# Patient Record
Sex: MALE | Race: WHITE | URBAN - METROPOLITAN AREA
[De-identification: names, ages, dates, MRNs, and addresses within clinical notes are randomized per-mention and may not be internally consistent; named-entity substitution may affect disease eponyms.]

---

## 2017-04-06 ENCOUNTER — TRANSFERRED RECORDS (OUTPATIENT)
Dept: HEALTH INFORMATION MANAGEMENT | Facility: CLINIC | Age: 37
End: 2017-04-06

## 2017-04-07 ENCOUNTER — TELEPHONE (OUTPATIENT)
Dept: OPHTHALMOLOGY | Facility: CLINIC | Age: 37
End: 2017-04-07

## 2017-04-07 ENCOUNTER — OFFICE VISIT (OUTPATIENT)
Dept: OPHTHALMOLOGY | Facility: CLINIC | Age: 37
End: 2017-04-07
Attending: OPHTHALMOLOGY
Payer: COMMERCIAL

## 2017-04-07 DIAGNOSIS — H53.10 SUBJECTIVE VISUAL DISTURBANCE, RIGHT EYE: Primary | ICD-10-CM

## 2017-04-07 DIAGNOSIS — H53.10 SUBJECTIVE VISUAL DISTURBANCE, RIGHT EYE: ICD-10-CM

## 2017-04-07 DIAGNOSIS — H47.011 NONARTERITIC ISCHEMIC OPTIC NEUROPATHY OF RIGHT EYE: Primary | ICD-10-CM

## 2017-04-07 PROCEDURE — 92250 FUNDUS PHOTOGRAPHY W/I&R: CPT | Mod: ZF | Performed by: OPHTHALMOLOGY

## 2017-04-07 PROCEDURE — 99212 OFFICE O/P EST SF 10 MIN: CPT | Mod: ZF

## 2017-04-07 PROCEDURE — 92083 EXTENDED VISUAL FIELD XM: CPT | Mod: ZF | Performed by: OPHTHALMOLOGY

## 2017-04-07 PROCEDURE — 92283 EXTND COLOR VISION XM: CPT | Mod: ZF | Performed by: OPHTHALMOLOGY

## 2017-04-07 PROCEDURE — 92133 CPTRZD OPH DX IMG PST SGM ON: CPT | Mod: ZF | Performed by: OPHTHALMOLOGY

## 2017-04-07 ASSESSMENT — SLIT LAMP EXAM - LIDS
COMMENTS: NORMAL
COMMENTS: NORMAL

## 2017-04-07 ASSESSMENT — EXTERNAL EXAM - LEFT EYE: OS_EXAM: NORMAL

## 2017-04-07 ASSESSMENT — TONOMETRY
IOP_METHOD: TONOPEN
OS_IOP_MMHG: 14
OD_IOP_MMHG: 13

## 2017-04-07 ASSESSMENT — CUP TO DISC RATIO
OD_RATIO: 0.1
OS_RATIO: 0.2

## 2017-04-07 ASSESSMENT — CONF VISUAL FIELD
OS_NORMAL: 1
OD_INFERIOR_NASAL_RESTRICTION: 3

## 2017-04-07 ASSESSMENT — REFRACTION_WEARINGRX
OS_AXIS: 155
OD_SPHERE: -2.00
OD_AXIS: 010
OS_CYLINDER: +0.50
OS_SPHERE: -2.25
OD_CYLINDER: +1.00

## 2017-04-07 ASSESSMENT — VISUAL ACUITY
OS_CC: 20/15
METHOD: SNELLEN - LINEAR
CORRECTION_TYPE: GLASSES
OS_CC: J1+
OD_CC+: -2
OD_CC: J1+
OD_CC: 20/15

## 2017-04-07 ASSESSMENT — EXTERNAL EXAM - RIGHT EYE: OD_EXAM: NORMAL

## 2017-04-07 NOTE — PROGRESS NOTES
Assessment & Plan     Yakov Harris is a 36 year old male with the following diagnoses:   1. 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 Subjective visual disturbance, right eye       35YO M with no past med hx referred VRS for possible NAION / optic neuritis of the right eye. Distorted vision lower periphery right eye noticed initially 2 weeks ago. Not a blind spot but there is a distortion. Slowly progressing. No eye pain. No other visual symptoms.     Wife states that he does snore and has had apneic episodes (although these don't occur frequently).  He does not take any meds. He denies taking cilais or vigara.    He has superior segmental swelling of the right eye. He is small C:D ratio of the left eye. This is consistent with NAION. We discussed possible risk factors, including his sleep apnea. He will make an appointment with his PCP to check his blood pressure, cholestrol, sugars, as well as to discuss sleep study.     RTC in 1 month or sooner if worsening symptoms.     Marshall Eden MD  Ophthalmology resident, PGY-3                 Not seen by staff during this visit, available should need have arisen.  Plan appropriate as above.    Per Fong MD  , Comprehensive Ophthalmology  Department of Ophthalmology and Visual Neurosciences  [facility]

## 2017-04-07 NOTE — TELEPHONE ENCOUNTER
Dr. Abdirashid Peck referring to neuro-ophthalmology for NAION vs optic neuropathy  Records to be faxed  No MRI   Spoke to pt   Pt reports new right eye vision loss about march 27th.  Blurred scattered vision in lower peripheral vision  Has worsened in past 12 days.  No pain with eye movement    Reviewed with Dr. dEen and Dr. Eden will see today for sonny Tatum RN 11:33 AM 04/07/17

## 2017-04-07 NOTE — NURSING NOTE
Chief Complaints and History of Present Illnesses   Patient presents with     New Patient     Subjective visual disturbance, right eye (Primary      HPI    Affected eye(s):  Right   Symptoms:     Blurred vision   Distorted vision   No double vision   No floaters   Flashes      Frequency:  Constant          Comments:  Pt. Stated no double vision, he has a head ache today but due to the dilation drops OU.  Nils Benitez  1:24 PM April 7, 2017

## 2017-04-07 NOTE — MR AVS SNAPSHOT
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      Who to contact     Please call your clinic at [phone] to:    Ask questions about your health    Make or cancel appointments    Discuss your medicines    Learn about your test results    Speak to your doctor   If you have compliments or concerns about an experience at your clinic, or if you wish to file a complaint, please contact [facility] Patient Relations at [phone] or email us at [email or web address]         Additional Information About Your Visit        MyChart Information     AppLabst gives you secure access to your electronic health record. If you see a primary care provider, you can also send messages to your care team and make appointments. If you have questions, please call your primary care clinic.  If you do not have a primary care provider, please call [phone] and they will assist you.      Sinimanes is an electronic gateway that provides easy, online access to your medical records. With Sinimanes, you can request a clinic appointment, read your test results, renew a prescription or communicate with your care team.     To access your existing account, please contact your [facility] or call [phone] for assistance.        Care EveryWhere ID     This is your Care EveryWhere ID. This could be used by other organizations to access your [place] medical records  [id number]          Blood Pressure from Last 3 Encounters:   No data found for BP    Weight from Last 3 Encounters:   No data found for Wt              We Performed the Following     Color Vision - Screening OU (both eyes)     Fundus Photos OU (both eyes)     Glaucoma Top OU     OCT Optic Nerve RNFL Spectralis OU (both eyes)        Primary Care Provider    No Pcp Confirmed       No address on file        Thank you!     Thank you for choosing EYE CLINIC  for your care. Our goal is always to provide you with excellent care. Hearing back from our patients is one way we can continue to improve our services. Please take a few minutes to complete the written survey that you may receive in the mail after your visit with us. Thank you!             Your Updated Medication List - Protect others around you: Learn how to safely use, store and throw away your medicines at www.disposemymeds.org.      Notice  As of 4/7/2017 11:59 PM    You have not been prescribed any medications.

## 2017-04-11 ENCOUNTER — TELEPHONE (OUTPATIENT)
Dept: OPHTHALMOLOGY | Facility: CLINIC | Age: 37
End: 2017-04-11

## 2017-04-12 ENCOUNTER — OFFICE VISIT (OUTPATIENT)
Dept: OPHTHALMOLOGY | Facility: CLINIC | Age: 37
End: 2017-04-12

## 2017-04-12 DIAGNOSIS — H46.9 OPTIC NEUROPATHY: ICD-10-CM

## 2017-04-12 DIAGNOSIS — H46.9 OPTIC NEUROPATHY: Primary | ICD-10-CM

## 2017-04-12 LAB
CRP SERPL-MCNC: <2.9 MG/L (ref 0–8)
ERYTHROCYTE [DISTWIDTH] IN BLOOD BY AUTOMATED COUNT: 12.1 % (ref 10–15)
ERYTHROCYTE [SEDIMENTATION RATE] IN BLOOD BY WESTERGREN METHOD: 4 MM/H (ref 0–15)
HCT VFR BLD AUTO: 44.7 % (ref 40–53)
HGB BLD-MCNC: 15.6 G/DL (ref 13.3–17.7)
MCH RBC QN AUTO: 30.5 PG (ref 26.5–33)
MCHC RBC AUTO-ENTMCNC: 34.9 G/DL (ref 31.5–36.5)
MCV RBC AUTO: 88 FL (ref 78–100)
PLATELET # BLD AUTO: 251 10E9/L (ref 150–450)
RBC # BLD AUTO: 5.11 10E12/L (ref 4.4–5.9)
WBC # BLD AUTO: 4.5 10E9/L (ref 4–11)

## 2017-04-12 ASSESSMENT — SLIT LAMP EXAM - LIDS
COMMENTS: NORMAL
COMMENTS: NORMAL

## 2017-04-12 ASSESSMENT — VISUAL ACUITY
METHOD: SNELLEN - LINEAR
OD_CC: J1+
CORRECTION_TYPE: GLASSES
OD_CC: 20/15
OS_CC: 20/15
OS_CC: J1+

## 2017-04-12 ASSESSMENT — CONF VISUAL FIELD
OD_NORMAL: 1
METHOD: COUNTING FINGERS
OS_NORMAL: 1

## 2017-04-12 ASSESSMENT — TONOMETRY
OD_IOP_MMHG: 21
OS_IOP_MMHG: 23
IOP_METHOD: ICARE

## 2017-04-12 ASSESSMENT — REFRACTION_WEARINGRX
OD_AXIS: 010
OS_AXIS: 155
OS_SPHERE: -2.25
OD_SPHERE: -2.00
OS_CYLINDER: +0.50
OD_CYLINDER: +1.00

## 2017-04-12 ASSESSMENT — EXTERNAL EXAM - LEFT EYE: OS_EXAM: NORMAL

## 2017-04-12 ASSESSMENT — CUP TO DISC RATIO
OD_RATIO: 0.1
OS_RATIO: 0.2

## 2017-04-12 ASSESSMENT — EXTERNAL EXAM - RIGHT EYE: OD_EXAM: NORMAL

## 2017-04-12 NOTE — NURSING NOTE
Chief Complaints and History of Present Illnesses   Patient presents with     Consult For     Distorted/blurred vision RE     HPI    Affected eye(s):  Both   Symptoms:     Blurred vision (Comment: RE, left lower quadrant)   No floaters   No flashes (Comment: once in a while, just in the portion where the vision is affected)      Duration:  3 weeks      Do you have eye pain now?:  No      Comments:  Patient notes he has been having HA x 5-6 days pretty constant, used ibuprofen doesn't seem to help per pt    Jeanne Pacheco April 12, 2017 9:18 AM

## 2017-04-12 NOTE — MR AVS SNAPSHOT
After Visit Summary   4/12/2017    Yakov Harris    MRN: [id number]           Patient Information     Date Of Birth          1980        Visit Information        Provider Department      4/12/2017 9:00 AM Romario Vu MD [facility] Ophthalmology        Today's Diagnoses     Optic neuropathy    -  1       Follow-ups after your visit        Your next 10 appointments already scheduled     Apr 13, 2017  6:30 PM CDT   MR BRAIN W/O & W CONTRAST with WYMR2   [facility] ([facility])    [address] [place] [address]   [phone]           Take your medicines as usual, unless your doctor tells you not to. Bring a list of your current medicines to your exam (including vitamins, minerals and over-the-counter drugs).  You will be given intravenous contrast for this exam. To prepare:   The day before your exam, drink extra fluids at least six 8-ounce glasses (unless your doctor tells you to restrict your fluids).   Have a blood test (creatinine test) within 30 days of your exam. Go to your clinic or Diagnostic Imaging Department for this test.  The MRI machine uses a strong magnet. Please wear clothes without metal (snaps, zippers). A sweatsuit works well, or we may give you a hospital gown.  Please remove any body piercings and hair extensions before you arrive. You will also remove watches, jewelry, hairpins, wallets, dentures, partial dental plates and hearing aids. You may wear contact lenses, and you may be able to wear your rings. We have a safe place to keep your personal items, but it is safer to leave them at home.   **IMPORTANT** THE INSTRUCTIONS BELOW ARE ONLY FOR THOSE PATIENTS WHO HAVE BEEN TOLD THEY WILL RECEIVE SEDATION OR GENERAL ANESTHESIA DURING THEIR MRI PROCEDURE:  IF YOU WILL RECEIVE SEDATION (take medicine to help you relax during your exam):   You must get the medicine from your doctor before you arrive. Bring the medicine to the exam. Do  not take it at home.   Arrive one hour early. Bring someone who can take you home after the test. Your medicine will make you sleepy. After the exam, you may not drive, take a bus or take a taxi by yourself.   No eating 8 hours before your exam. You may have clear liquids up until 4 hours before your exam. (Clear liquids include water, clear tea, black coffee and fruit juice without pulp.)  IF YOU WILL RECEIVE ANESTHESIA (be asleep for your exam):   Arrive 1 1/2 hours early. Bring someone who can take you home after the test. You may not drive, take a bus or take a taxi by yourself.   No eating 8 hours before your exam. You may have clear liquids up until 4 hours before your exam. (Clear liquids include water, clear tea, black coffee and fruit juice without pulp.)  Please call the Imaging Department at your exam site with any questions.            Apr 13, 2017  7:30 PM CDT   MR ORBIT FACE NECK W/O & W CONTRAST with [address] MRI ([facility])    [address] [address]   [phone]           Take your medicines as usual, unless your doctor tells you not to. Bring a list of your current medicines to your exam (including vitamins, minerals and over-the-counter drugs).  You will be given intravenous contrast for this exam. To prepare:   The day before your exam, drink extra fluids at least six 8-ounce glasses (unless your doctor tells you to restrict your fluids).   Have a blood test (creatinine test) within 30 days of your exam. Go to your clinic or Diagnostic Imaging Department for this test.  The MRI machine uses a strong magnet. Please wear clothes without metal (snaps, zippers). A sweatsuit works well, or we may give you a hospital gown.  Please remove any body piercings and hair extensions before you arrive. You will also remove watches, jewelry, hairpins, wallets, dentures, partial dental plates and hearing aids. You may wear contact lenses, and you may be able  to wear your rings. We have a safe place to keep your personal items, but it is safer to leave them at home.   **IMPORTANT** THE INSTRUCTIONS BELOW ARE ONLY FOR THOSE PATIENTS WHO HAVE BEEN TOLD THEY WILL RECEIVE SEDATION OR GENERAL ANESTHESIA DURING THEIR MRI PROCEDURE:  IF YOU WILL RECEIVE SEDATION (take medicine to help you relax during your exam):   You must get the medicine from your doctor before you arrive. Bring the medicine to the exam. Do not take it at home.   Arrive one hour early. Bring someone who can take you home after the test. Your medicine will make you sleepy. After the exam, you may not drive, take a bus or take a taxi by yourself.   No eating 8 hours before your exam. You may have clear liquids up until 4 hours before your exam. (Clear liquids include water, clear tea, black coffee and fruit juice without pulp.)  IF YOU WILL RECEIVE ANESTHESIA (be asleep for your exam):   Arrive 1 1/2 hours early. Bring someone who can take you home after the test. You may not drive, take a bus or take a taxi by yourself.   No eating 8 hours before your exam. You may have clear liquids up until 4 hours before your exam. (Clear liquids include water, clear tea, black coffee and fruit juice without pulp.)  Please call the Imaging Department at your exam site with any questions.              Future tests that were ordered for you today     Open Future Orders        Priority Expected Expires Ordered    Angiotensin converting enzyme Routine  7/11/2017 4/12/2017    Antinuclear antibody screen by EIA Routine  7/11/2017 4/12/2017    Antineutrophil cytoplasmic Clementina IgG Routine  7/11/2017 4/12/2017    CBC with platelets Routine  7/11/2017 4/12/2017    CRP inflammation Routine  7/11/2017 4/12/2017    Erythrocyte sedimentation rate auto Routine  7/11/2017 4/12/2017    Neuromyelitis Optica AQP4 IgG w Reflex Blood Routine  7/11/2017 4/12/2017    Anti Treponema Routine  7/11/2017 4/12/2017    MRI Brain w & w/o contrast Routine   2018    MR Orbit Face Neck w/o & w Contrast Routine  2018            Who to contact     Please call your clinic at [phone] to:    Ask questions about your health    Make or cancel appointments    Discuss your medicines    Learn about your test results    Speak to your doctor   If you have compliments or concerns about an experience at your clinic, or if you wish to file a complaint, please contact [facility] Patient Relations at [phone] or email us at [email or web address]         Additional Information About Your Visit        CMEharApprova Information     Otelict is an electronic gateway that provides easy, online access to your medical records. With Bio-Tree Systems, you can request a clinic appointment, read your test results, renew a prescription or communicate with your care team.     To sign up for Otelict visit the website at [email or web address]   You will be asked to enter the access code listed below, as well as some personal information. Please follow the directions to create your username and password.     Your access code is: [email or web address]  Expires: 2017  6:30 AM     Your access code will  in 90 days. If you need help or a new code, please contact your [facility] or call [phone] for assistance.        Care EveryWhere ID     This is your Care EveryWhere ID. This could be used by other organizations to access your [place] medical records  [id number]         Blood Pressure from Last 3 Encounters:   No data found for BP    Weight from Last 3 Encounters:   No data found for Wt               Primary Care Provider    No Pcp Confirmed       No address on file        Thank you!     Thank you for choosing [facility] OPHTHALMOLOGY  for your care. Our goal is always to provide you with excellent care. Hearing back from our patients is one way we can continue to improve our services. Please take a few  minutes to complete the written survey that you may receive in the mail after your visit with us. Thank you!             Your Updated Medication List - Protect others around you: Learn how to safely use, store and throw away your medicines at www.disposemymeds.org.      Notice  As of 4/12/2017 10:46 AM    You have not been prescribed any medications.

## 2017-04-12 NOTE — LETTER
2017         RE:  :  MRN: Yakov Harris  1980  [id number]     Dear Dr. Peck,    Thank you for asking me to see your very pleasant patient, Yakov Harris, in neuro-ophthalmic consultation.  I would like to thank you for sending your records and I have summarized them in the history of present illness. He presented with his spouse who provided additional history.  My assessment and plan are below.  For further details, please see my attached clinic note.      Assessment & Plan     Yakov Harris is a 36 year old male with the following diagnoses:   1. Optic neuropathy       He has superior segmental swelling of the right eye. The differential diagnosis includes optic neuritis versus NAION. I would recommend a brain and orbital MRI with and without contrast.  I will also obtain an Anti-nuclear antibody, ACE, Lyme, RPR, and NMO IgG and manage accordingly. Follow up 6 weeks if testing is normal sooner as needed for worsening symptoms.      Again, thank you for allowing me to participate in the care of your patient.      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

## 2017-04-12 NOTE — PROGRESS NOTES
Assessment & Plan     Yakov Harris is a 36 year old male with the following diagnoses:   1. Optic neuropathy       He has superior segmental swelling of the right eye. The differential diagnosis includes optic neuritis versus NAION. I would recommend a brain and orbital MRI with and without contrast.  I will also obtain an Anti-nuclear antibody, ACE, Lyme, RPR, and NMO IgG and manage accordingly. Follow up 6 weeks if testing is normal sooner as needed for worsening symptoms.           Attending Physician Attestation:  I have seen and examined this patient.  I have confirmed and edited as necessary the chief complaint(s), history of present illness, review of systems, relevant history, and examination findings as documented by others.  I have personally reviewed the relevant tests, images, and reports as documented above.  I have confirmed and edited as necessary the assessment and plan and agree with this note.  - Romario Vu MD 11:16 AM 4/12/2017

## 2017-04-13 ENCOUNTER — HOSPITAL ENCOUNTER (OUTPATIENT)
Dept: MRI IMAGING | Facility: CLINIC | Age: 37
Discharge: HOME | End: 2017-04-13
Attending: OPHTHALMOLOGY | Admitting: OPHTHALMOLOGY
Payer: COMMERCIAL

## 2017-04-13 ENCOUNTER — HOSPITAL ENCOUNTER (OUTPATIENT)
Dept: MRI IMAGING | Facility: CLINIC | Age: 37
End: 2017-04-13
Attending: OPHTHALMOLOGY
Payer: COMMERCIAL

## 2017-04-13 DIAGNOSIS — H46.9 OPTIC NEUROPATHY: ICD-10-CM

## 2017-04-13 LAB
ACE SERPL-CCNC: 43 U/L
ANA SER QL IA: NORMAL
T PALLIDUM IGG+IGM SER QL: NEGATIVE

## 2017-04-13 PROCEDURE — A9585 GADOBUTROL INJECTION: HCPCS | Performed by: OPHTHALMOLOGY

## 2017-04-13 PROCEDURE — 25500064 ZZH RX 255 OP 636: Performed by: OPHTHALMOLOGY

## 2017-04-13 PROCEDURE — 70543 MRI ORBT/FAC/NCK W/O &W/DYE: CPT

## 2017-04-13 PROCEDURE — 70553 MRI BRAIN STEM W/O & W/DYE: CPT

## 2017-04-13 RX ORDER — GADOBUTROL 604.72 MG/ML
10 INJECTION INTRAVENOUS ONCE
Status: COMPLETED | OUTPATIENT
Start: 2017-04-13 | End: 2017-04-13

## 2017-04-13 RX ADMIN — GADOBUTROL 10 ML: 604.72 INJECTION INTRAVENOUS at 18:59

## 2017-04-14 LAB — ANCA IGG TITR SER IF: NORMAL {TITER}

## 2017-04-19 LAB — AQP4 H2O CHANNEL IGG TITR SERPL IF: NORMAL {TITER}

## 2017-06-01 ENCOUNTER — OFFICE VISIT (OUTPATIENT)
Dept: OPHTHALMOLOGY | Facility: CLINIC | Age: 37
End: 2017-06-01
Attending: OPHTHALMOLOGY
Payer: COMMERCIAL

## 2017-06-01 DIAGNOSIS — H53.10 SUBJECTIVE VISUAL DISTURBANCE, RIGHT EYE: ICD-10-CM

## 2017-06-01 DIAGNOSIS — H47.011 NONARTERITIC ISCHEMIC OPTIC NEUROPATHY OF RIGHT EYE: ICD-10-CM

## 2017-06-01 PROCEDURE — 99213 OFFICE O/P EST LOW 20 MIN: CPT | Mod: ZF

## 2017-06-01 PROCEDURE — 92133 CPTRZD OPH DX IMG PST SGM ON: CPT | Mod: ZF | Performed by: OPHTHALMOLOGY

## 2017-06-01 PROCEDURE — 92083 EXTENDED VISUAL FIELD XM: CPT | Mod: ZF | Performed by: OPHTHALMOLOGY

## 2017-06-01 ASSESSMENT — REFRACTION_WEARINGRX
OD_CYLINDER: +1.00
OS_AXIS: 155
OS_CYLINDER: +0.50
OD_SPHERE: -2.00
OS_SPHERE: -2.25
OD_AXIS: 010

## 2017-06-01 ASSESSMENT — TONOMETRY
OS_IOP_MMHG: 26
OD_IOP_MMHG: 20
IOP_METHOD: ICARE
OS_IOP_MMHG: 25
IOP_METHOD: ICARE

## 2017-06-01 ASSESSMENT — VISUAL ACUITY
OD_CC: 20/15-
METHOD: SNELLEN - LINEAR
OS_CC: 20/15
CORRECTION_TYPE: GLASSES

## 2017-06-01 ASSESSMENT — SLIT LAMP EXAM - LIDS
COMMENTS: NORMAL
COMMENTS: NORMAL

## 2017-06-01 ASSESSMENT — CONF VISUAL FIELD
OD_INFERIOR_NASAL_RESTRICTION: 3
OS_NORMAL: 1

## 2017-06-01 ASSESSMENT — EXTERNAL EXAM - RIGHT EYE: OD_EXAM: NORMAL

## 2017-06-01 ASSESSMENT — CUP TO DISC RATIO
OS_RATIO: 0.2
OD_RATIO: 0.1

## 2017-06-01 ASSESSMENT — EXTERNAL EXAM - LEFT EYE: OS_EXAM: NORMAL

## 2017-06-01 NOTE — PROGRESS NOTES
Assessment & Plan     Yakov Harris is a 37 year old male with the following diagnoses:   1. Subjective visual disturbance, right eye    2. 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  The optic disc edema is  Gone and his visual field is stable.  This is all most consistent with nonarteritic anterior ischemic optic neuropathy.  Gave him information about recurrence rate, avoiding ED drugs, blood pressure medications at night.  Follow up 2 year sooner as needed for worsening symptoms.           Attending Physician Attestation:  I have seen and examined this patient.  I have confirmed and edited as necessary the chief complaint(s), history of present illness, review of systems, relevant history, and examination findings as documented by others.  I have personally reviewed the relevant tests, images, and reports as documented above.  I have confirmed and edited as necessary the assessment and plan and agree with this note.  - Romario Vu MD 1:07 PM 6/1/2017

## 2017-06-01 NOTE — LETTER
2017         RE:  :  MRN: Yakov Harris  1980  [id number]     Dear Dr. Peck:     Your patient, Yakov Harris, returned for neuro-ophthalmic follow up. My assessment and plan are below.  For further details, please see my attached clinic note.      Assessment & Plan     Yakov Harris is a 37 year old male with the following diagnoses:   1. Subjective visual disturbance, right eye    2. 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  The optic disc edema is  Gone and his visual field is stable.  This is all most consistent with nonarteritic anterior ischemic optic neuropathy.  Gave him information about recurrence rate, avoiding ED drugs, blood pressure medications at night.  Follow up 2 year sooner as needed for worsening symptoms.      Again, thank you for allowing me to participate in the care of your patient.      Sincerely,    Romario Vu MD  Professor, Neuro-Ophthalmology  Department of Ophthalmology and Visual Neurosciences  [facility]

## 2017-06-01 NOTE — MR AVS SNAPSHOT
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           Follow-ups after your visit        Future tests that were ordered for you today     Open Future Orders        Priority Expected Expires Ordered    Glaucoma Top OU Routine  12/3/2018 6/1/2017            Who to contact     Please call your clinic at [phone] to:    Ask questions about your health    Make or cancel appointments    Discuss your medicines    Learn about your test results    Speak to your doctor   If you have compliments or concerns about an experience at your clinic, or if you wish to file a complaint, please contact [facility] Patient Relations at [phone] or email us at [email or web address]         Additional Information About Your Visit        MyChart Information     AUPEO!t gives you secure access to your electronic health record. If you see a primary care provider, you can also send messages to your care team and make appointments. If you have questions, please call your primary care clinic.  If you do not have a primary care provider, please call [phone] and they will assist you.      ebindle is an electronic gateway that provides easy, online access to your medical records. With ebindle, you can request a clinic appointment, read your test results, renew a prescription or communicate with your care team.     To access your existing account, please contact your [facility] or call [phone] for assistance.        Care EveryWhere ID     This is your Care EveryWhere ID. This could be used by other organizations to access your  [place] medical records  [id number]         Blood Pressure from Last 3 Encounters:   No data found for BP    Weight from Last 3 Encounters:   No data found for Wt              We Performed the Following     Glaucoma Top OU     OCT Optic Nerve RNFL Spectralis OU (both eyes)        Primary Care Provider    No Pcp Confirmed       No address on file        Thank you!     Thank you for choosing EYE CLINIC  for your care. Our goal is always to provide you with excellent care. Hearing back from our patients is one way we can continue to improve our services. Please take a few minutes to complete the written survey that you may receive in the mail after your visit with us. Thank you!             Your Updated Medication List - Protect others around you: Learn how to safely use, store and throw away your medicines at www.disposemymeds.org.      Notice  As of 6/1/2017  1:09 PM    You have not been prescribed any medications.

## 2017-06-01 NOTE — NURSING NOTE
Chief Complaints and History of Present Illnesses   Patient presents with     Follow Up For     Optic neuropathy      HPI    Affected eye(s):  Right   Symptoms:     Blurred vision   No decreased vision   No flashes         Do you have eye pain now?:  No      Comments:  No changes with [facility]. No flashes. No scotomas.   Rebecca SOTO June 1, 2017 9:22 AM

## 2017-06-26 ENCOUNTER — OFFICE VISIT (OUTPATIENT)
Dept: OPHTHALMOLOGY | Facility: CLINIC | Age: 37
End: 2017-06-26
Attending: OPHTHALMOLOGY
Payer: COMMERCIAL

## 2017-06-26 DIAGNOSIS — R51.9 HEADACHE ABOVE THE EYE REGION: Primary | ICD-10-CM

## 2017-06-26 DIAGNOSIS — H53.10 SUBJECTIVE VISUAL DISTURBANCE, RIGHT EYE: ICD-10-CM

## 2017-06-26 DIAGNOSIS — H47.011 NONARTERITIC ISCHEMIC OPTIC NEUROPATHY OF RIGHT EYE: ICD-10-CM

## 2017-06-26 PROCEDURE — 92083 EXTENDED VISUAL FIELD XM: CPT | Mod: ZF | Performed by: OPHTHALMOLOGY

## 2017-06-26 PROCEDURE — 99213 OFFICE O/P EST LOW 20 MIN: CPT | Mod: ZF

## 2017-06-26 ASSESSMENT — VISUAL ACUITY
OD_CC: 20/20
METHOD: SNELLEN - LINEAR
OS_CC: 20/20
CORRECTION_TYPE: GLASSES

## 2017-06-26 ASSESSMENT — REFRACTION_WEARINGRX
OS_CYLINDER: +0.50
OD_AXIS: 010
OD_CYLINDER: +1.00
OS_AXIS: 155
OD_SPHERE: -2.00
OS_SPHERE: -2.25

## 2017-06-26 ASSESSMENT — EXTERNAL EXAM - LEFT EYE: OS_EXAM: NORMAL

## 2017-06-26 ASSESSMENT — EXTERNAL EXAM - RIGHT EYE: OD_EXAM: NORMAL

## 2017-06-26 ASSESSMENT — CUP TO DISC RATIO
OD_RATIO: 0.1
OS_RATIO: 0.2

## 2017-06-26 ASSESSMENT — TONOMETRY
IOP_METHOD: TONOPEN
OD_IOP_MMHG: 17
OS_IOP_MMHG: 19

## 2017-06-26 ASSESSMENT — CONF VISUAL FIELD
OD_NORMAL: 1
OS_NORMAL: 1

## 2017-06-26 ASSESSMENT — SLIT LAMP EXAM - LIDS
COMMENTS: NORMAL
COMMENTS: NORMAL

## 2017-06-26 NOTE — PROGRESS NOTES
Assessment & Plan     Yakov Harris is a 37 year old male with the following diagnoses:   1. Headache above the eye region    2. 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 Subjective visual disturbance, right eye         Here for follow up NAION with new headaches the past 2 weeks.  The optic disc edema has resolved and his visual field is stable. He recently had a child (5 days ago) and stress may be playing a role. His vision is stable.  He also sees a green light superotemp to fixation 2x/d. It lasts seconds.  It is not getting better or worse and has been going on about 2 weeks as well.  Will observe for now.  Follow up as needed for worsening symptoms.        Attending Physician Attestation:  Complete documentation of historical and exam elements from today's encounter can be found in the full encounter summary report (not reduplicated in this progress note).  I personally obtained the chief complaint(s) and history of present illness.  I confirmed and edited as necessary the review of systems, past medical/surgical history, family history, social history, and examination findings as documented by others; and I examined the patient myself.  I personally reviewed the relevant tests, images, and reports as documented above.  I formulated and edited as necessary the assessment and plan and discussed the findings and management plan with the patient and family. - Romario Vu MD

## 2017-06-26 NOTE — MR AVS SNAPSHOT
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        Subjective visual disturbance, right eye           Follow-ups after your visit        Who to contact     Please call your clinic at [phone] to:    Ask questions about your health    Make or cancel appointments    Discuss your medicines    Learn about your test results    Speak to your doctor   If you have compliments or concerns about an experience at your clinic, or if you wish to file a complaint, please contact [facility] Patient Relations at [phone] or email us at [email or web address]         Additional Information About Your Visit        MyChart Information     [employer] gives you secure access to your electronic health record. If you see a primary care provider, you can also send messages to your care team and make appointments. If you have questions, please call your primary care clinic.  If you do not have a primary care provider, please call [phone] and they will assist you.      [employer] is an electronic gateway that provides easy, online access to your medical records. With [employer], you can request a clinic appointment, read your test results, renew a prescription or communicate with your care team.     To access your existing account, please contact your [facility] or call [phone] for assistance.        Care EveryWhere ID     This is your Care EveryWhere ID. This could be used by other organizations to access your [facility] medical records  [id number]         Blood Pressure from Last 3 Encounters:   No data found for BP    Weight from  Last 3 Encounters:   No data found for Wt              We Performed the Following     Glaucoma Top OU        Primary Care Provider    No Pcp Confirmed       No address on file        Equal Access to Services     JOSE ABBOTT : Hadii aad ku hadelbajulian Booker, maryaantonieta mezabibiha, annamariemelchor ajgina montanez, marizol nakulin hayaan caryall bagley laNuryszack amin. So [facility] [phone].    ATENCIÓN: Si habla español, tiene a arellano disposición servicios gratuitos de asistencia lingüística. Llame al [phone].    We comply with applicable federal civil rights laws and [place] laws. We do not discriminate on the basis of race, color, national origin, age, disability sex, sexual orientation or gender identity.            Thank you!     Thank you for choosing EYE CLINIC  for your care. Our goal is always to provide you with excellent care. Hearing back from our patients is one way we can continue to improve our services. Please take a few minutes to complete the written survey that you may receive in the mail after your visit with us. Thank you!             Your Updated Medication List - Protect others around you: Learn how to safely use, store and throw away your medicines at www.disposemymeds.org.      Notice  As of 6/26/2017  3:43 PM    You have not been prescribed any medications.

## 2017-06-26 NOTE — NURSING NOTE
Chief Complaints and History of Present Illnesses   Patient presents with     Neurologic Problem     OPTIC NEUROPATHY     HPI    Symptoms:              Comments:  Yakov Harris is a 37 year old male with the following diagnoses:   1. Subjective visual disturbance, right eye   2. 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   New: headaches. Daily. Intractable. 1.5/10 all over. Comes to temples. Especially right temple and right eye. Onset since the last visit. No precipitating event.   No tinnitus  No diplopia.   Has a shadow spot centrally. Notices in the morning. Does not move like a floater.     [facility] [employer] 2:11 PM June 26, 2017

## 2017-09-21 ENCOUNTER — MYC MEDICAL ADVICE (OUTPATIENT)
Dept: OPHTHALMOLOGY | Facility: CLINIC | Age: 37
End: 2017-09-21

## 2017-09-22 NOTE — TELEPHONE ENCOUNTER
Spoke to pt via telephone this AM  Right eye gradual change symptoms over past 3 months  H/o seeing shape lasting few seconds (dr. Vu aware at previous appt per pt) then goes away  Pt states that shape seems larger and notices more times/day  Visual acuity stable, no visual field changes   Headaches still present but better  Frequency varies from occasionally not at all to 2-10 times/day  Scheduled evaluation next week with dr. Vu    Note to dr. Vu for review  Juan C Tatum RN 11:32 AM 09/22/17

## 2017-09-23 DIAGNOSIS — H53.10 SUBJECTIVE VISUAL DISTURBANCE: Primary | ICD-10-CM

## 2017-09-28 ENCOUNTER — OFFICE VISIT (OUTPATIENT)
Dept: OPHTHALMOLOGY | Facility: CLINIC | Age: 37
End: 2017-09-28
Attending: OPHTHALMOLOGY
Payer: COMMERCIAL

## 2017-09-28 DIAGNOSIS — H53.10 SUBJECTIVE VISUAL DISTURBANCE: ICD-10-CM

## 2017-09-28 PROCEDURE — 92083 EXTENDED VISUAL FIELD XM: CPT | Mod: ZF | Performed by: OPHTHALMOLOGY

## 2017-09-28 PROCEDURE — 92133 CPTRZD OPH DX IMG PST SGM ON: CPT | Mod: ZF | Performed by: OPHTHALMOLOGY

## 2017-09-28 PROCEDURE — 99213 OFFICE O/P EST LOW 20 MIN: CPT | Mod: ZF

## 2017-09-28 ASSESSMENT — CUP TO DISC RATIO
OS_RATIO: 0.2
OD_RATIO: 0.1

## 2017-09-28 ASSESSMENT — REFRACTION_WEARINGRX
OS_AXIS: 155
OD_CYLINDER: +1.00
OS_SPHERE: -2.25
OD_SPHERE: -2.00
OS_CYLINDER: +0.50
OD_AXIS: 010

## 2017-09-28 ASSESSMENT — TONOMETRY
OD_IOP_MMHG: 18
OS_IOP_MMHG: 19
IOP_METHOD: TONOPEN

## 2017-09-28 ASSESSMENT — SLIT LAMP EXAM - LIDS
COMMENTS: NORMAL
COMMENTS: NORMAL

## 2017-09-28 ASSESSMENT — VISUAL ACUITY
OD_CC: 20/15
OS_CC: 20/15
METHOD: SNELLEN - LINEAR

## 2017-09-28 ASSESSMENT — CONF VISUAL FIELD
OS_NORMAL: 1
METHOD: COUNTING FINGERS
OD_INFERIOR_TEMPORAL_RESTRICTION: 3

## 2017-09-28 ASSESSMENT — EXTERNAL EXAM - LEFT EYE: OS_EXAM: NORMAL

## 2017-09-28 ASSESSMENT — EXTERNAL EXAM - RIGHT EYE: OD_EXAM: NORMAL

## 2017-09-28 NOTE — MR AVS SNAPSHOT
After Visit Summary   9/28/2017    Yakov Harris    MRN: [id number]           Patient Information     Date Of Birth          1980        Visit Information        Provider Department      9/28/2017 1:00 PM Romario Vu MD [facility]        Today's Diagnoses     Subjective visual disturbance           Follow-ups after your visit        Additional Services     ERG Referral                 Your next 10 appointments already scheduled     Oct 03, 2017  1:00 PM CDT   ERG with [facility] EYE ELECTRODIAGNOSTIC   Eye Clinic ([facility])    [address]  [address]  [place] [address]   [phone]              Who to contact     Please call your clinic at [phone] to:    Ask questions about your health    Make or cancel appointments    Discuss your medicines    Learn about your test results    Speak to your doctor   If you have compliments or concerns about an experience at your clinic, or if you wish to file a complaint, please contact [facility] Patient Relations at [phone] or email us at [email or web address]         Additional Information About Your Visit        MyChart Information     [employer] gives you secure access to your electronic health record. If you see a primary care provider, you can also send messages to your care team and make appointments. If you have questions, please call your primary care clinic.  If you do not have a primary care provider, please call [phone] and they will assist you.      [employer] is an electronic gateway that provides easy, online access to your medical records. With [employer], you can request a clinic appointment, read your test results, renew a prescription or communicate with your care team.     To access your existing account, please contact your [facility] or call [phone] for assistance.        Care EveryWhere ID     This is your  Care EveryWhere ID. This could be used by other organizations to access your [place] medical records  [id number]         Blood Pressure from Last 3 Encounters:   No data found for BP    Weight from Last 3 Encounters:   No data found for Wt              We Performed the Following     ERG Referral     Glaucoma Top OU     OCT Optic Nerve RNFL Spectralis OU (both eyes)        Primary Care Provider    None Specified       No primary provider on file.        Equal Access to Services     [facility]: Hadii aad ku hadelbao Sojaredali, waaxda luqadaha, qaybta kaalmada adeallyada, marizol alfaro adeall casillasdoreenmiryam antoine . So [facility] [phone].    ATENCIÓN: Si habla español, tiene a arellano disposición servicios gratuitos de asistencia lingüística. Llame al [phone].    We comply with applicable federal civil rights laws and [place] laws. We do not discriminate on the basis of race, color, national origin, age, disability sex, sexual orientation or gender identity.            Thank you!     Thank you for choosing EYE CLINIC  for your care. Our goal is always to provide you with excellent care. Hearing back from our patients is one way we can continue to improve our services. Please take a few minutes to complete the written survey that you may receive in the mail after your visit with us. Thank you!             Your Updated Medication List - Protect others around you: Learn how to safely use, store and throw away your medicines at www.disposemymeds.org.      Notice  As of 9/28/2017  3:27 PM    You have not been prescribed any medications.

## 2017-09-28 NOTE — PROGRESS NOTES
"       Assessment & Plan     Yakov Harris is a 37 year old male with the following diagnoses:   1. Subjective visual disturbance       Mr. Harris presents for 3 month follow up NAION in the right eye. His vision has remained stable. The headaches are occurring less frequently.     He has noticed worsening of the central \"spot\" in the right eye over the past few months. It appears more frequently and covers a larger area. He estimates he sees it 2-10 times per day. He notices it more when he goes between dark and light environments. He notices the spot every morning when he looks at his phone. It seems like a \"reaction to seeing light.\" The spot disappears after a few seconds without any intervention. He also notices a dark oval in the center of the vision in both eyes in low-light conditions.    The central spot in his vision is worsening and he is wondering if this is worrisome. He states it is different from a floater.  His visual field and optical coherence tomography are stable.  The symptom is more consistent with a retinal cause. It is not in the visual field defect and is likely not Forest Bonnet Syndrome.  Recommend multifocal electroretinogram.  If this is normal, then observe.          Attending Physician Attestation:  Complete documentation of historical and exam elements from today's encounter can be found in the full encounter summary report (not reduplicated in this progress note).  I personally obtained the chief complaint(s) and history of present illness.  I confirmed and edited as necessary the review of systems, past medical/surgical history, family history, social history, and examination findings as documented by others; and I examined the patient myself.  I personally reviewed the relevant tests, images, and reports as documented above.  I formulated and edited as necessary the assessment and plan and discussed the findings and management plan with the patient and family. - Romario Vu MD    "

## 2017-09-28 NOTE — NURSING NOTE
Chief Complaints and History of Present Illnesses   Patient presents with     Follow Up For      Subjective visual disturbance      HPI    Last Eye Exam:  6/26/17   Affected eye(s):  Right   Symptoms:        Frequency:  Intermittent       Do you have eye pain now?:  No      Comments:  He is here today for a follow up of Subjective visual disturbance.  For the past three months he has seen an after image with his right eye. This usually happens in the morning and can distort his vision. The pattern is an oval that sometimes closes to a line and can disappear all together. Always appears in lower light conditions. Sometimes his right eye aches and he also still suffers headaches however less severe than last  Visit.    Teodoro Bragg COT 1:25 PM September 28, 2017

## 2017-10-02 DIAGNOSIS — H53.10 SUBJECTIVE VISUAL DISTURBANCE: Primary | ICD-10-CM

## 2017-10-03 ENCOUNTER — TELEPHONE (OUTPATIENT)
Dept: OPHTHALMOLOGY | Facility: CLINIC | Age: 37
End: 2017-10-03

## 2017-10-03 ENCOUNTER — ALLIED HEALTH/NURSE VISIT (OUTPATIENT)
Dept: OPHTHALMOLOGY | Facility: CLINIC | Age: 37
End: 2017-10-03
Attending: OPHTHALMOLOGY
Payer: COMMERCIAL

## 2017-10-03 DIAGNOSIS — H53.19 PHOTOPSIA OF RIGHT EYE: Primary | ICD-10-CM

## 2017-10-03 DIAGNOSIS — H53.10 SUBJECTIVE VISUAL DISTURBANCE: ICD-10-CM

## 2017-10-03 PROCEDURE — 40000269 ZZH STATISTIC NO CHARGE FACILITY FEE: Mod: ZF

## 2017-10-03 PROCEDURE — 92275 MFERG OU (BOTH EYES): CPT | Mod: ZF

## 2017-10-03 ASSESSMENT — REFRACTION_WEARINGRX
OS_AXIS: 155
OS_CYLINDER: +0.50
OD_AXIS: 010
OD_SPHERE: -2.00
OS_SPHERE: -2.25
OD_CYLINDER: +1.00

## 2017-10-03 ASSESSMENT — VISUAL ACUITY
OD_CC: 20/15
CORRECTION_TYPE: GLASSES
METHOD: SNELLEN - LINEAR
OS_CC: 20/15

## 2017-10-03 NOTE — PROGRESS NOTES
Assessment & Plan     Yakov Harris is a 37 year old male with the following diagnoses:   1. Photopsia of right eye    2. Subjective visual disturbance       Normal multifocal electroretinogram both eyes.           Attending Physician Attestation:  Complete documentation of historical and exam elements from today's encounter can be found in the full encounter summary report (not reduplicated in this progress note).  I personally obtained the chief complaint(s) and history of present illness.  I confirmed and edited as necessary the review of systems, past medical/surgical history, family history, social history, and examination findings as documented by others; and I examined the patient myself.  I personally reviewed the relevant tests, images, and reports as documented above.  I formulated and edited as necessary the assessment and plan and discussed the findings and management plan with the patient and family. - Romario Vu MD

## 2017-10-03 NOTE — NURSING NOTE
Chief Complaints and History of Present Illnesses   Patient presents with     Procedure     mfERG     HPI    Affected eye(s):  Both   Symptoms:           Do you have eye pain now?:  No      Comments:  Marylu SOTO 1:17 PM October 3, 2017

## 2017-10-03 NOTE — TELEPHONE ENCOUNTER
Called patient to tell him that the multifocal electroretinogram was normal. We discussed that he should continue routine eye exams and contact us with any new or concerning symptoms.

## 2019-09-03 NOTE — MR AVS SNAPSHOT
After Visit Summary   10/3/2017    Yakov Harris    MRN: [id number]           Patient Information     Date Of Birth          1980        Visit Information        Provider Department      10/3/2017 1:00 PM [facility] EYE ELECTRODIAGNOSTIC [facility] Clinic        Today's Diagnoses     Photopsia of right eye    -  1    Subjective visual disturbance           Follow-ups after your visit        Who to contact     Please call your clinic at [phone] to:    Ask questions about your health    Make or cancel appointments    Discuss your medicines    Learn about your test results    Speak to your doctor   If you have compliments or concerns about an experience at your clinic, or if you wish to file a complaint, please contact [facility] Patient Relations at [phone] or email us at [email or web address]         Additional Information About Your Visit        MyChart Information     [employer] gives you secure access to your electronic health record. If you see a primary care provider, you can also send messages to your care team and make appointments. If you have questions, please call your primary care clinic.  If you do not have a primary care provider, please call [phone] and they will assist you.      [employer] is an electronic gateway that provides easy, online access to your medical records. With [employer], you can request a clinic appointment, read your test results, renew a prescription or communicate with your care team.     To access your existing account, please contact your [facility] or call [phone] for assistance.        Care EveryWhere ID     This is your Care EveryWhere ID. This could be used by other organizations to access your [place] medical records  [id number]         Blood Pressure from Last 3 Encounters:   No data found for BP    Weight from Last 3 Encounters:   No data found for Wt              We Performed the  Following     MFERG OU (both eyes)        Primary Care Provider    None Specified       No primary provider on file.        Equal Access to Services     JOSE ABBOTT : Hadii darci Booker, emy berman, devan zuritamaantonieta montanez, marizol casillasdoreenmiryam amin. So [facility] [phone].    ATENCIÓN: Si habla español, tiene a arellano disposición servicios gratuitos de asistencia lingüística. Llame al [phone].    We comply with applicable federal civil rights laws and [place] laws. We do not discriminate on the basis of race, color, national origin, age, disability, sex, sexual orientation, or gender identity.            Thank you!     Thank you for choosing EYE CLINIC  for your care. Our goal is always to provide you with excellent care. Hearing back from our patients is one way we can continue to improve our services. Please take a few minutes to complete the written survey that you may receive in the mail after your visit with us. Thank you!             Your Updated Medication List - Protect others around you: Learn how to safely use, store and throw away your medicines at www.disposemymeds.org.      Notice  As of 10/3/2017  3:37 PM    You have not been prescribed any medications.       Self

## 2020-03-02 ENCOUNTER — HEALTH MAINTENANCE LETTER (OUTPATIENT)
Age: 40
End: 2020-03-02

## 2020-12-20 ENCOUNTER — HEALTH MAINTENANCE LETTER (OUTPATIENT)
Age: 40
End: 2020-12-20

## 2021-04-24 ENCOUNTER — HEALTH MAINTENANCE LETTER (OUTPATIENT)
Age: 41
End: 2021-04-24

## 2021-10-03 ENCOUNTER — HEALTH MAINTENANCE LETTER (OUTPATIENT)
Age: 41
End: 2021-10-03

## 2022-05-15 ENCOUNTER — HEALTH MAINTENANCE LETTER (OUTPATIENT)
Age: 42
End: 2022-05-15

## 2022-09-10 ENCOUNTER — HEALTH MAINTENANCE LETTER (OUTPATIENT)
Age: 42
End: 2022-09-10

## 2023-06-03 ENCOUNTER — HEALTH MAINTENANCE LETTER (OUTPATIENT)
Age: 43
End: 2023-06-03